# Patient Record
Sex: FEMALE | Race: WHITE | NOT HISPANIC OR LATINO | Employment: FULL TIME | URBAN - METROPOLITAN AREA
[De-identification: names, ages, dates, MRNs, and addresses within clinical notes are randomized per-mention and may not be internally consistent; named-entity substitution may affect disease eponyms.]

---

## 2024-11-12 ENCOUNTER — OFFICE VISIT (OUTPATIENT)
Dept: URGENT CARE | Facility: CLINIC | Age: 64
End: 2024-11-12
Payer: COMMERCIAL

## 2024-11-12 VITALS
HEART RATE: 70 BPM | TEMPERATURE: 97.8 F | OXYGEN SATURATION: 94 % | HEIGHT: 62 IN | SYSTOLIC BLOOD PRESSURE: 115 MMHG | DIASTOLIC BLOOD PRESSURE: 80 MMHG | WEIGHT: 145.8 LBS | BODY MASS INDEX: 26.83 KG/M2

## 2024-11-12 DIAGNOSIS — L30.9 ECZEMA, UNSPECIFIED TYPE: Primary | ICD-10-CM
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
## 2024-11-12 PROBLEM — L98.8 SKIN LESION OF BREAST: Status: ACTIVE | Noted: 2024-11-12

## 2024-11-12 PROCEDURE — 1036F TOBACCO NON-USER: CPT

## 2024-11-12 PROCEDURE — 3008F BODY MASS INDEX DOCD: CPT

## 2024-11-12 PROCEDURE — 99213 OFFICE O/P EST LOW 20 MIN: CPT

## 2024-11-12 RX ORDER — CEPHALEXIN 500 MG/1
500 CAPSULE ORAL 2 TIMES DAILY
Qty: 14 CAPSULE | Refills: 0 | Status: SHIPPED | OUTPATIENT
Start: 2024-11-12 | End: 2024-11-19

## 2024-11-12 RX ORDER — TRIAMCINOLONE ACETONIDE 1 MG/G
OINTMENT TOPICAL 2 TIMES DAILY
Qty: 30 G | Refills: 0 | Status: SHIPPED | OUTPATIENT
Start: 2024-11-12 | End: 2024-11-22

## 2024-11-12 RX ORDER — VITAMIN B COMPLEX
CAPSULE ORAL EVERY 24 HOURS
COMMUNITY

## 2024-11-12 RX ORDER — MULTIVIT-MIN/IRON/FOLIC ACID/K 18-600-40
1 CAPSULE ORAL DAILY
COMMUNITY

## 2024-11-12 RX ORDER — PREDNISONE 20 MG/1
TABLET ORAL
COMMUNITY
Start: 2021-04-22 | End: 2024-11-12 | Stop reason: ALTCHOICE

## 2024-11-12 RX ORDER — PREDNISONE 10 MG/1
TABLET ORAL
Qty: 31 TABLET | Refills: 0 | Status: SHIPPED | OUTPATIENT
Start: 2024-11-12 | End: 2024-11-26

## 2024-11-12 RX ORDER — NORETHINDRONE 0.35 MG/1
1 TABLET ORAL DAILY
COMMUNITY

## 2024-11-12 RX ORDER — LACTOBACILLUS RHAMNOSUS GG 10B CELL
CAPSULE ORAL
COMMUNITY

## 2024-11-12 RX ORDER — NORETHINDRONE ACETATE AND ETHINYL ESTRADIOL 1.5-30(21)
KIT ORAL
COMMUNITY

## 2024-11-12 ASSESSMENT — ENCOUNTER SYMPTOMS
PALPITATIONS: 0
NAIL CHANGES: 0
CONSTITUTIONAL NEGATIVE: 1
SHORTNESS OF BREATH: 0
COUGH: 0
MYALGIAS: 0
NEUROLOGICAL NEGATIVE: 1
NAUSEA: 0
FACIAL SWELLING: 0
HEADACHES: 0
RHINORRHEA: 0
ABDOMINAL PAIN: 0
TROUBLE SWALLOWING: 0
COLOR CHANGE: 0
JOINT SWELLING: 1
GASTROINTESTINAL NEGATIVE: 1
CARDIOVASCULAR NEGATIVE: 1
DIZZINESS: 0
RESPIRATORY NEGATIVE: 1
EYE REDNESS: 0
WOUND: 0
FATIGUE: 0
ARTHRALGIAS: 0
CHILLS: 0
VOICE CHANGE: 0
SORE THROAT: 0
WEAKNESS: 0
DIAPHORESIS: 0
VOMITING: 0
DIARRHEA: 0
FEVER: 0

## 2024-11-12 ASSESSMENT — VISUAL ACUITY: OU: 1

## 2024-11-12 NOTE — PATIENT INSTRUCTIONS
ATOPIC DERMATITIS (ECZEMA)      - PREDNISONE oral steroid taper has been prescribed for reducing inflammation and skin reaction   - TRIAMCINOLONE steroid cream was prescribed for topical treatment of rash    - CEPHALEXIN was prescribed due to open wounds of hands with exposure to enclosed aquatic environment with living creatures     - Take Benadryl Allergy 25 mg 1 pill every 4 hours as needed for severe swelling or itching.   - You can also try calamine lotion, Benadryl cream, or oatmeal baths     - Eczema care discussed including avoidance of hot showers and strong soaps or detergents to wash clothes, use of emollients with occasional use of topical prescription steroids if needed. Role of allergens also reviewed.     - Avoid very hot showers as this opens the pores and drys out the skin making it more sensitive to irritants.   - Use of fragrance-free and dye-free products may be necessary for products that touch the skin (free & clear laundry detergents, body soaps and lotions -CeraVe and Eucerin are recommended, and no perfumes)   - Many wall plug-in scents seem to cause irritated skin in people who have more sensitive skin. You may need to stop use of these or scented candles to test if you skin/rash improves     Steroids are strong medications that mimic the body's natural production of cortisol and is prescribed to reduce inflammation especially when pain is caused by inflammation. Short-term side effects of steroids reviewed, including gastritis, insomnia, moodiness, acne, fluid retention (leg swelling) and potential for increase in blood pressure or sugar. Steroids are often prescribed to be tapered off into lower doses after prolonged use to avoid withdrawal symptoms such as fatigue, weakness, body aches, nausea, and lightheadedness.  However, if a severe reaction is noted (including rash or difficulty breathing) discontinue and seek emergency care immediately.

## 2024-11-12 NOTE — PROGRESS NOTES
Subjective   History  Marifer Guadarrama is a 64 y.o. female who presents for Rash.    Patient presents flare of her eczema worst on her hands worsening over the last month. Has been using OTC hydrocortisone with minimal relief. Has a saltwater fish/coral tank that had an allgae outbreak so she has been having her hands in the tank daily for cleanings for the last several weeks and that seems to have triggered this.      History provided by:  Patient and medical records  Rash  This is a recurrent problem. The affected locations include the left hand, left wrist, right hand, right wrist, right arm and left arm. The rash is characterized by blistering, burning, itchiness, dryness, pain, redness, swelling and scaling. Associated with: salt water fish tank. Pertinent negatives include no congestion, cough, diarrhea, facial edema, fatigue, fever, joint pain, nail changes, rhinorrhea, shortness of breath, sore throat or vomiting. Past treatments include topical steroids and antihistamine (OTC hydrocortisone). The treatment provided mild relief. Her past medical history is significant for eczema.     No past surgical history on file.  Social History     Tobacco Use    Smoking status: Never    Smokeless tobacco: Never   Substance Use Topics    Drug use: Never       Review of Systems   Review of Systems   Constitutional: Negative.  Negative for chills, diaphoresis, fatigue and fever.   HENT: Negative.  Negative for congestion, facial swelling, rhinorrhea, sore throat, trouble swallowing and voice change.    Eyes:  Negative for redness.   Respiratory: Negative.  Negative for cough and shortness of breath.    Cardiovascular: Negative.  Negative for chest pain and palpitations.   Gastrointestinal: Negative.  Negative for abdominal pain, diarrhea, nausea and vomiting.   Musculoskeletal:  Positive for joint swelling. Negative for arthralgias, gait problem, joint pain and myalgias.   Skin:  Positive for rash. Negative for color  "change, nail changes and wound.   Neurological: Negative.  Negative for dizziness, weakness and headaches.       Objective   Vital Signs  /80 (BP Location: Left arm, Patient Position: Sitting, BP Cuff Size: Small adult)   Pulse 70   Temp 36.6 °C (97.8 °F) (Oral)   Ht 1.575 m (5' 2\")   Wt 66.1 kg (145 lb 12.8 oz)   SpO2 94%   BMI 26.67 kg/m²    All vitals have been reviewed and are stable.     Physical Exam  Physical Exam  Vitals and nursing note reviewed.   Constitutional:       General: She is awake. She is not in acute distress.     Appearance: Normal appearance. She is well-developed. She is not ill-appearing, toxic-appearing or diaphoretic.   HENT:      Head: Normocephalic and atraumatic. No right periorbital erythema or left periorbital erythema.      Jaw: There is normal jaw occlusion.      Right Ear: External ear normal.      Left Ear: External ear normal.      Nose: Nose normal. No congestion or rhinorrhea.      Mouth/Throat:      Lips: Pink. No lesions.      Mouth: Mucous membranes are moist. No oral lesions or angioedema.      Pharynx: Oropharynx is clear.   Eyes:      General: Lids are normal. Vision grossly intact. Gaze aligned appropriately.      Extraocular Movements: Extraocular movements intact.      Conjunctiva/sclera: Conjunctivae normal.      Right eye: Right conjunctiva is not injected.      Left eye: Left conjunctiva is not injected.      Pupils: Pupils are equal, round, and reactive to light.   Cardiovascular:      Rate and Rhythm: Normal rate and regular rhythm.   Pulmonary:      Effort: Pulmonary effort is normal. No tachypnea or respiratory distress.      Breath sounds: Normal air entry. No stridor. No wheezing.   Abdominal:      General: Abdomen is flat. There is no distension.      Palpations: Abdomen is soft.   Musculoskeletal:         General: No swelling or deformity. Normal range of motion.      Cervical back: Full passive range of motion without pain, normal range of " motion and neck supple.   Skin:     General: Skin is warm and dry.      Findings: Rash (bilateral hands spreading up forearms) present. No erythema. Rash is crusting, macular and scaling (cracked skin). Rash is not papular, purpuric, pustular, urticarial or vesicular.   Neurological:      General: No focal deficit present.      Mental Status: She is alert and oriented to person, place, and time. Mental status is at baseline.      Motor: Motor function is intact.      Coordination: Coordination is intact.   Psychiatric:         Mood and Affect: Mood and affect normal.         Speech: Speech normal.         Behavior: Behavior normal. Behavior is cooperative.         Thought Content: Thought content normal.         Judgment: Judgment normal.         Diagnostic Results   No results found for this or any previous visit (from the past 48 hours).    Assessment/Plan   Procedures   N/A    Problem List Items Addressed This Visit       Eczema - Primary    Relevant Medications    predniSONE (Deltasone) 10 mg tablet    triamcinolone (Kenalog) 0.1 % ointment    cephalexin (Keflex) 500 mg capsule     Other Visit Diagnoses       Other contact with other nonvenomous marine animals, initial encounter        Relevant Medications    cephalexin (Keflex) 500 mg capsule            UC Course  Patient disposition: Home    Red flags for reporting to ER have been reviewed with the patient.    Current diagnosis, any medication changes, and all in-office lab or radiologic results have been reviewed with the patient at the time of the visit.   If symptoms do not improve or worsen, patient is to follow up with PCP or report to the emergency room.   Patient is alert and oriented x3 and non-toxic appearing. Vital signs are stable.   Patient and/or guardian has sufficient decision-making capabilities at this time and reports understanding and agreement with the treatment plan made through shared decision-making.

## 2025-03-18 ENCOUNTER — OFFICE VISIT (OUTPATIENT)
Dept: URGENT CARE | Facility: CLINIC | Age: 65
End: 2025-03-18
Payer: COMMERCIAL

## 2025-03-18 VITALS
HEIGHT: 62 IN | HEART RATE: 72 BPM | SYSTOLIC BLOOD PRESSURE: 138 MMHG | OXYGEN SATURATION: 95 % | TEMPERATURE: 97.5 F | WEIGHT: 146.2 LBS | DIASTOLIC BLOOD PRESSURE: 86 MMHG | BODY MASS INDEX: 26.91 KG/M2

## 2025-03-18 DIAGNOSIS — L20.9 ATOPIC DERMATITIS, UNSPECIFIED TYPE: Primary | ICD-10-CM

## 2025-03-18 PROCEDURE — 99213 OFFICE O/P EST LOW 20 MIN: CPT | Performed by: PHYSICIAN ASSISTANT

## 2025-03-18 PROCEDURE — 3008F BODY MASS INDEX DOCD: CPT | Performed by: PHYSICIAN ASSISTANT

## 2025-03-18 RX ORDER — PREDNISONE 20 MG/1
TABLET ORAL
Qty: 18 TABLET | Refills: 0 | Status: SHIPPED | OUTPATIENT
Start: 2025-03-18 | End: 2025-03-27

## 2025-03-18 RX ORDER — TRIAMCINOLONE ACETONIDE 1 MG/G
OINTMENT TOPICAL 2 TIMES DAILY
Qty: 80 G | Refills: 0 | Status: SHIPPED | OUTPATIENT
Start: 2025-03-18 | End: 2025-04-17

## 2025-03-18 NOTE — PROGRESS NOTES
"Subjective   Patient ID: Marifer Guadarrama is a 64 y.o. female.    Rash on stomach, arms, legs x 1 month. Does have a hx of eczema. Has tried OTC Benadryl with some relief at night. She was \"always prescribed something for her eczema\" since she was 22-something. Used to get prednisone and creams. Tried to put her on a pill every day but caused drowsiness. She has not seen a dermatologist since 2012. She does not see a PCP. Denies: fever, chills, headache, dizziness, CP, SOB, abdominal pain, N/V/D, swelling, bruising, ear pain, loss of sense of taste/smell.       History provided by:  Patient   used: No      Review of Systems   All other systems reviewed and are negative.    Objective     Physical Exam  Vitals reviewed.   Constitutional:       General: She is awake.      Appearance: Normal appearance. She is well-developed.   HENT:      Head: Normocephalic and atraumatic.      Nose: Rhinorrhea present. Rhinorrhea is clear.      Right Turbinates: Enlarged and swollen.      Left Turbinates: Enlarged and swollen.      Mouth/Throat:      Lips: Pink.      Mouth: Mucous membranes are moist.      Pharynx: Oropharynx is clear.   Pulmonary:      Effort: Pulmonary effort is normal.      Breath sounds: Normal breath sounds.   Musculoskeletal:      Cervical back: Full passive range of motion without pain.      Right lower leg: No edema.      Left lower leg: No edema.   Skin:     General: Skin is warm and dry.      Findings: Rash (papular, dry, excoriations present) present. No lesion.   Neurological:      General: No focal deficit present.      Mental Status: She is alert and oriented to person, place, and time.      Cranial Nerves: No facial asymmetry.      Motor: Motor function is intact.      Gait: Gait is intact.   Psychiatric:         Attention and Perception: Attention normal.         Mood and Affect: Mood and affect normal.         Assessment/Plan   Problem List Items Addressed This Visit    None  Visit " Diagnoses         Codes    Atopic dermatitis, unspecified type    -  Primary L20.9    Relevant Medications    predniSONE (Deltasone) 20 mg tablet    triamcinolone (Kenalog) 0.1 % ointment          Medications Rx as above for Eczema   Recommend Zyrtec at night, Flonase in the AM for allergies.   Follow up with PCP or dermatology     Red flag symptoms reviewed with patient and all questions answered. Patient or parent/guardian verbalized understanding and agreement with care plan as above. All in office testing reviewed with patient. If symptoms worsen or do not improve, patient is to follow up with PCP or report to the ER.      Patient disposition: Home   I have reviewed and confirmed nurses' notes for patient's medications, allergies, medical history, and surgical history.

## 2025-03-18 NOTE — LETTER
March 18, 2025     Patient: Marifer Guadarrama   YOB: 1960   Date of Visit: 3/18/2025       To Whom It May Concern:    Marifer Guadarrama was seen in my clinic on 3/18/2025 at 11:00 am. Please excuse Marifer for her absence from work on this day to make the appointment.    If you have any questions or concerns, please don't hesitate to call.         Sincerely,         Cori Childs PA-C        CC: No Recipients